# Patient Record
Sex: MALE | Race: BLACK OR AFRICAN AMERICAN
[De-identification: names, ages, dates, MRNs, and addresses within clinical notes are randomized per-mention and may not be internally consistent; named-entity substitution may affect disease eponyms.]

---

## 2019-03-14 ENCOUNTER — HOSPITAL ENCOUNTER (EMERGENCY)
Dept: HOSPITAL 72 - EMR | Age: 79
Discharge: NURSING, REHAB OR LONG-TERM CARE | End: 2019-03-14
Payer: COMMERCIAL

## 2019-03-14 VITALS — DIASTOLIC BLOOD PRESSURE: 66 MMHG | SYSTOLIC BLOOD PRESSURE: 126 MMHG

## 2019-03-14 VITALS — BODY MASS INDEX: 22.73 KG/M2 | WEIGHT: 150 LBS | HEIGHT: 68 IN

## 2019-03-14 VITALS — DIASTOLIC BLOOD PRESSURE: 86 MMHG | SYSTOLIC BLOOD PRESSURE: 155 MMHG

## 2019-03-14 DIAGNOSIS — S82.831A: Primary | ICD-10-CM

## 2019-03-14 DIAGNOSIS — M79.604: ICD-10-CM
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
## 2019-03-14 DIAGNOSIS — I50.9: ICD-10-CM

## 2019-03-14 DIAGNOSIS — M79.605: ICD-10-CM
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
## 2019-03-14 DIAGNOSIS — Z95.1: ICD-10-CM

## 2019-03-14 DIAGNOSIS — Z86.718: ICD-10-CM

## 2019-03-14 DIAGNOSIS — E11.9: ICD-10-CM

## 2019-03-14 PROCEDURE — 29505 APPLICATION LONG LEG SPLINT: CPT

## 2019-03-14 PROCEDURE — 99284 EMERGENCY DEPT VISIT MOD MDM: CPT

## 2019-03-14 PROCEDURE — 93970 EXTREMITY STUDY: CPT

## 2019-03-14 NOTE — NUR
ED Nurse Note:



Pt cleared by health care Provider for discharge.  DC instructions/prescription 
was given and explained to pt and verbalized understanding of teachings. All 
medical deviecs such as ID band  removed. Pt is AAO x3, was transferred back to 
[facility] via ambulance. Pt's family made aware of the transfer. Telephone 
report given to BETY Butcher at the facility.

## 2019-03-14 NOTE — DIAGNOSTIC IMAGING REPORT
Indication: Left leg pain

 

Technique: 2 views of the left tibia and fibula

 

Comparison: none

 

Findings: There are surgical clips medially in the upper leg. No acute fractures. No

dislocations. Small ossific density projecting adjacent to the tibial tubercle is

probably chronic

 

Impression: No acute process

## 2019-03-14 NOTE — DIAGNOSTIC IMAGING REPORT
Indication: Left knee pain

 

Technique: 3 views of the left knee

 

Comparison: None

 

Findings: Osseous fragment is seen at the insertion of the patellar tendon to the

tibial tubercle. This is probably corticated. No definite acute fractures or

dislocations otherwise. There is medial compartmental degenerative joint space

narrowing. There is extensive vascular calcification. No suprapatellar effusion.

 

Impression: Bony fragment adjacent to the tibial tubercle, probably old but acute

avulsion fracture not completely excludable.

 

Degenerative changes, as described

 

Findings discussed by phone with Dr. Roger in the emergency room at the time of

interpretation

## 2019-03-14 NOTE — NUR
ED Nurse Note:



 BROUGHT IN BY RA 26 FROM [facility] DUE TO BILATERAL 
FEET AND LEGS PAIN 5/10 AFTER PHYSICAL THERAPY THIS MORNING. NO RECENT INJURY 
NOR TRAUMA. A/OX3.

## 2019-03-14 NOTE — DIAGNOSTIC IMAGING REPORT
Indication: Leg pain

 

Technique: 2 views of the right tibia and fibula

 

Comparison: none

 

Findings: No acute fractures. No dislocations. No radiopaque foreign body

demonstrated. There are vascular calcifications. Fibular abnormality demonstrated on

simultaneous knee radiograph is less apparent on these images

 

Impression: No acute process

 

Please also refer to report of separate knee radiograph

## 2019-03-14 NOTE — DIAGNOSTIC IMAGING REPORT
Indication: Bilateral lower extremity pain

 

Technique: Grayscale and duplex images of the bilateral lower extremity veins

 

Comparison: none

 

Findings: Bilaterally, grayscale duplex images demonstrate no evidence of

intraluminal thrombus. Normal compressibility of all deep venous structures. Normal

phasic Doppler waveforms, demonstrating normal augmentation response and no evidence

of valvular insufficiency.

 

Impression: Negative for evidence of lower extremity deep venous thrombosis

bilaterally

## 2019-03-14 NOTE — DIAGNOSTIC IMAGING REPORT
Indication: Right knee pain

 

Technique: 3 views of the right knee

 

Comparison: None

 

Findings: On the oblique view, ossific density projects adjacent to and medial to the

fibular head, obscured on other views, may reflect old injury. This appears to be

well-corticated and does not appear to be acute. No acute fractures. No dislocations.

The joint spaces are preserved. No suprapatellar effusion. There are extensive

vascular calcifications.

 

Impression: Suspect old proximal fibular injury. No acute bony trauma

## 2019-03-18 NOTE — EMERGENCY ROOM REPORT
History of Present Illness


General


Chief Complaint:  Pain


Source:  Medical Record, EMS





Present Illness


HPI


78-year-old male presents ED for evaluation.  Patient coming from skilled 

nursing facility complaining of bilateral leg pain.  Started earlier today 

after physical therapy.  Patient points to both legs complaining of pain.  

Denies any fall or injury.  Patient is unable to verbalize as to the nature or 

that intensity of the pain.  No reported aggravating relieving factors.  No 

other associated symptoms


Allergies:  


Coded Allergies:  


     No Known Allergies (Unverified , 3/14/19)





Patient History


Past Medical History:  DM, CHF, AFib, other - DVT


Pertinent Family History:  none


Social History:  Denies: smoking, alcohol use, drug use


Immunizations:  UTD


Reviewed Nursing Documentation:  PMH: Agreed; PSxH: Agreed





Nursing Documentation-PMH


Past Medical History:  No History, Except For


Hx Cardiac Problems:  Yes - ATRIAL IBRILLATION; HF' CARDIOMEGALY;


Hx Hypertension:  Yes - DVT OF UNSPECIFIED LOWER EXTREMITY; CORONARY ARTERY 

BYPAS GRAFT


Hx Diabetes:  Yes - DM2





Review of Systems


All Other Systems:  limited





Physical Exam





Vital Signs








  Date Time  Temp Pulse Resp B/P (MAP) Pulse Ox O2 Delivery O2 Flow Rate FiO2


 


3/14/19 11:50 98.6 70 16 128/66 96 Room Air  








Sp02 EP Interpretation:  reviewed, normal


General Appearance:  alert, non-toxic, mild distress, other - nonverbal


Head:  normocephalic


Eyes:  bilateral eye normal inspection, bilateral eye PERRL


ENT:  normal ENT inspection


Neck:  full range of motion


Respiratory:  normal inspection


Cardiovascular #1:  normal inspection


Gastrointestinal:  normal inspection


Rectal:  deferred


Genitourinary:  no CVA tenderness


Musculoskeletal:  back normal, gait/station normal, normal range of motion, 

tender


Neurologic:  alert, oriented x3, responsive, motor strength/tone normal, 

sensory intact, speech normal


Psychiatric:  normal inspection


Skin:  normal inspection


Lymphatic:  normal inspection





Procedures


Splinting


Splinting :  


   Consent:  Verbal


   Pre-Made Type:  knee immobilizer - bilaterally


   Pre-Proc Neuro Vasc Exam:  normal


   Post-Proc Neuro Vasc Exam:  normal


   Patient Tolerated:  Well


   Complications:  None





Medical Decision Making


Diagnostic Impression:  


 Primary Impression:  


 Fracture, fibula, proximal


 Qualified Codes:  S82.831A - Other fracture of upper and lower end of right 

fibula, initial encounter for closed fracture


 Additional Impression:  


 Leg pain, bilateral


ER Course


Hospital Course 


78-year-old male presents ED with bilateral leg pain status post physical 

therapy.  History of DVT





Differential diagnoses include: Fracture, dislocation, sprain, contusion





Clinical course


Patient placed on stretcher.  After initial history and physical, I ordered 

pain medications and x-rays of bilateral knee and tib-fib and bilateral venous 

duplex





X-ray show questionable fracture near the left tibia tubercle, old proximal 

right fibula fracture





Venous duplex negative for DVT.





placed in bilateral knee immobilizer.  Safe for discharge for close outpatient 

follow-up.  Patient safely discharged back to skilled nursing facility





we'll provide ortho referrals





Diagnosis - proximal fibula fx, bilateral leg pain 





Stable and discharged to SNF.  apply ice, keep elevated.  weight bear as 

tolerated.  Followup with PMD/ortho.  Return to ED if symptoms recur or worsen


Other X-Ray Diagnostic Results


Other X-Ray Diagnostic Results #1:  


   X-Ray ordered:  R knee


   # of Views/Limited Vs Complete:  3 View


   Indication:  Pain


   EP Interpretation:  Yes


   Interpretation:  no dislocation, no soft tissue swelling, other - old 

proximal fibular fx


   Impression:  Other


   Electronically Signed by:  Electronically signed by Dhruv Roger MD


Other X-Ray Diagnostic Results #2:  


   X-Ray ordered:  L knee


   # of Views/Limited Vs Complete:  3 View


   Indication:  Pain


   EP Interpretation:  Yes


   Interpretation:  no dislocation, no soft tissue swelling, other - ? L tibial 

tubercle fx


   Impression:  Other


   Electronically Signed by:  Electronically signed by Dhruv Roger MD


Other X-Ray Diagnostic Results #3:  


   X-Ray ordered:  R tibfib


   # of Views/Limited Vs Complete:  2 View


   Indication:  Pain


   Interpretation:  no dislocation, no soft tissue swelling, no fractures


   Impression:  No acute disease


   Electronically Signed by:  Electronically signed by Dhruv Roger MD


Other X-Ray Diagnostic Results #4:  


   X-Ray ordered:  L tibfib


   # of Views/Limited Vs Complete:  2 View


   Indication:  Pain


   EP Interpretation:  Yes


   Interpretation:  no dislocation, no soft tissue swelling, no fractures


   Impression:  No acute disease


   Electronically Signed by:  Electronically signed by Dhruv Roger MD





CT/MRI/US Diagnostic Results


CT/MRI/US Diagnostic Results :  


   Imaging Test Ordered:  Venous Duplex


   Impression


no dvt bilaterally





Last Vital Signs








  Date Time  Temp Pulse Resp B/P (MAP) Pulse Ox O2 Delivery O2 Flow Rate FiO2


 


3/14/19 16:47 98.6 76 11 155/86 96 Room Air  








Status:  improved


Disposition:  XFER SNF


Condition:  Stable


Referrals:  


Orhopedic Urgent Care





[facility]  **Open 24 hour /7 days a week by Appointment Only**





[address]


[facility] [address]


Patient Instructions:  Fibular Fracture With [facility]











Dhruv Roger MD Mar 18, 2019 07:37